# Patient Record
Sex: FEMALE | Race: WHITE
[De-identification: names, ages, dates, MRNs, and addresses within clinical notes are randomized per-mention and may not be internally consistent; named-entity substitution may affect disease eponyms.]

---

## 2018-12-14 ENCOUNTER — HOSPITAL ENCOUNTER (INPATIENT)
Dept: HOSPITAL 80 - FED | Age: 48
LOS: 2 days | Discharge: HOME | DRG: 918 | End: 2018-12-16
Attending: HOSPITALIST | Admitting: HOSPITALIST
Payer: SELF-PAY

## 2018-12-14 DIAGNOSIS — E87.6: ICD-10-CM

## 2018-12-14 DIAGNOSIS — E87.1: ICD-10-CM

## 2018-12-14 DIAGNOSIS — Z23: ICD-10-CM

## 2018-12-14 DIAGNOSIS — T65.892A: Primary | ICD-10-CM

## 2018-12-14 DIAGNOSIS — N17.9: ICD-10-CM

## 2018-12-14 DIAGNOSIS — R07.89: ICD-10-CM

## 2018-12-14 DIAGNOSIS — M54.5: ICD-10-CM

## 2018-12-14 DIAGNOSIS — F41.0: ICD-10-CM

## 2018-12-14 DIAGNOSIS — R79.89: ICD-10-CM

## 2018-12-14 DIAGNOSIS — F17.210: ICD-10-CM

## 2018-12-14 DIAGNOSIS — F60.3: ICD-10-CM

## 2018-12-14 DIAGNOSIS — E86.0: ICD-10-CM

## 2018-12-14 DIAGNOSIS — R11.2: ICD-10-CM

## 2018-12-14 LAB — PLATELET # BLD: 250 10^3/UL (ref 150–400)

## 2018-12-14 PROCEDURE — G0008 ADMIN INFLUENZA VIRUS VAC: HCPCS

## 2018-12-14 PROCEDURE — G0480 DRUG TEST DEF 1-7 CLASSES: HCPCS

## 2018-12-14 PROCEDURE — G0009 ADMIN PNEUMOCOCCAL VACCINE: HCPCS

## 2018-12-14 RX ADMIN — SODIUM CHLORIDE SCH MLS: 900 INJECTION, SOLUTION INTRAVENOUS at 16:09

## 2018-12-14 RX ADMIN — POTASSIUM CHLORIDE SCH MLS: 10 INJECTION, SOLUTION INTRAVENOUS at 12:03

## 2018-12-14 RX ADMIN — POTASSIUM CHLORIDE SCH MLS: 10 INJECTION, SOLUTION INTRAVENOUS at 13:50

## 2018-12-14 NOTE — PDGENHP
History and Physical





- Chief Complaint


N/V, ingestion of substance 6 days pta





- History of Present Illness


47 yo female with h/o anxiety and panic attacks with recent life stressors 

presents to ED with difficulty walking.  Six days pta she ingested potassium 

ferrous cyanide tri-hydrate, which she ordered from [employer].  Prior to this, she 

was distressed by a conflict with an old friend.  She also lives in a different 

state than her  and has been stressed with travel and work.  She just 

opened a hair salon.  Due to stress and conflict, she considered ingesting 

cyanide with intent to self harm.  She ordered what she thought was cyanide 

from the internet, but then regretted it and tried to cancel the order.  The 

substance arrived and she attempted to return it, but carried it around for 

days.  After a conflict with a friend, she became distraught and impulsive and 

took the substance.  She then spent several days induced vomiting and says she 

vomited consistently for 3-4 days.  No hematemesis or coffee ground emesis.  No 

fevers/chills.  No flank pain or urinary symptoms, but she did note dark, 

concentrated urine.  Today, she felt off balance and presented to the ED.  She 

is embarrassed by what she did and states she is not currently suicidal.  She 

does have h/o anxiety and panic, for which she takes occasional Valium.  


In the ED, she was found to have SHANELL with electrolyte disturbances and is 

admitted for further management.





History Information





- Allergies/Home Medication List


Allergies/Adverse Reactions: 








No Known Allergies Allergy (Verified 12/14/18 10:17)


 





Home Medications: 








Acetamn/Diphenhydramine 500/25 [Tylenol PM (*)] 1 - 3 mg PO HS PRN 12/14/18 [

Last Taken 12/13/18 3 tabs]


Acyclovir [Zovirax 400 mg (*)] 400 mg PO DAILY 12/14/18 [Last Taken Unknown]


Multivitamins [Multivitamin (*)] 1 each PO DAILY 12/14/18 [Last Taken Unknown]


Naphazoline HCl/Glycerin [Clear Eyes Redness Relief Drop] 1 drop OP DAILY PRN 12 /14/18 [Last Taken Unknown]





I have personally reviewed and updated: family history, medical history, social 

history, surgical history





- Past Medical History


Additional medical history: anxiety, panic attacks.  chronic back pain





- Surgical History


Reports: no pertinent surgical hx





- Family History


Additional family history: brother has schizophrenia





- Social History


Smoking Status: Current some day smoker


Alcohol Use: Other (intermittent high risk drinking)


Drug Use: None


Additional social history: , but  lives out of state.  Works as 

 and 





Review of Systems


Review of Systems: 





ROS: 10pt was reviewed & negative except for what was stated in HPI & below





Physical Exam


Physical Exam: 

















Temp Pulse Resp BP Pulse Ox


 


 36.9 C   100   20   144/100 H  99 


 


 12/14/18 15:15  12/14/18 15:15  12/14/18 15:15  12/14/18 15:15  12/14/18 15:15











Constitutional: no apparent distress


Eyes: PERRL


Ears, Nose, Mouth, Throat: dry mucous membranes


Cardiovascular: regular rate and rhythym


Respiratory: no respiratory distress, clear to auscultation


Gastrointestinal: normoactive bowel sounds, soft, non-tender abdomen


Skin: warm


Musculoskeletal: full muscle strength


Neurologic: AAOx3


Psychiatric: interacting appropriately





Lab Data & Imaging Review





 12/14/18 10:50





 12/14/18 13:35














WBC  10.94 10^3/uL (3.80-9.50)  H  12/14/18  10:50    


 


RBC  4.60 10^6/uL (4.18-5.33)   12/14/18  10:50    


 


Hgb  15.0 g/dL (12.6-16.3)   12/14/18  10:50    


 


Hct  42.3 % (38.0-47.0)   12/14/18  10:50    


 


MCV  92.0 fL (81.5-99.8)   12/14/18  10:50    


 


MCH  32.6 pg (27.9-34.1)   12/14/18  10:50    


 


MCHC  35.5 g/dL (32.4-36.7)   12/14/18  10:50    


 


RDW  11.9 % (11.5-15.2)   12/14/18  10:50    


 


Plt Count  250 10^3/uL (150-400)   12/14/18  10:50    


 


MPV  9.4 fL (8.7-11.7)   12/14/18  10:50    


 


Neut % (Auto)  81.2 % (39.3-74.2)  H  12/14/18  10:50    


 


Lymph % (Auto)  9.3 % (15.0-45.0)  L  12/14/18  10:50    


 


Mono % (Auto)  7.9 % (4.5-13.0)   12/14/18  10:50    


 


Eos % (Auto)  0.5 % (0.6-7.6)  L  12/14/18  10:50    


 


Baso % (Auto)  0.4 % (0.3-1.7)   12/14/18  10:50    


 


Nucleat RBC Rel Count  0.0 % (0.0-0.2)   12/14/18  10:50    


 


Absolute Neuts (auto)  8.89 10^3/uL (1.70-6.50)  H  12/14/18  10:50    


 


Absolute Lymphs (auto)  1.02 10^3/uL (1.00-3.00)   12/14/18  10:50    


 


Absolute Monos (auto)  0.86 10^3/uL (0.30-0.80)  H  12/14/18  10:50    


 


Absolute Eos (auto)  0.05 10^3/uL (0.03-0.40)   12/14/18  10:50    


 


Absolute Basos (auto)  0.04 10^3/uL (0.02-0.10)   12/14/18  10:50    


 


Absolute Nucleated RBC  0.00 10^3/uL (0-0.01)   12/14/18  10:50    


 


Immature Gran %  0.7 % (0.0-1.1)   12/14/18  10:50    


 


Immature Gran #  0.08 10^3/uL (0.00-0.10)   12/14/18  10:50    


 


D-Dimer  < 0.27 ug/mLFEU (0.00-0.50)   12/14/18  10:46    


 


Puncture Site  VENOUS   12/14/18  13:35    


 


Patient Temperature  37.0 DEGREES  12/14/18  13:35    


 


VBG pH  7.23  (7.31-7.42)  L  12/14/18  13:35    


 


VBG HCO3  15 mEQ/L (22-26)  L  12/14/18  13:35    


 


VBG Total CO2  16 mEq/L (21-27)  L  12/14/18  13:35    


 


VBG O2 Saturation  61 % (65-75)  L  12/14/18  13:35    


 


VBG Base Excess  -12.0 mEq/L (-2.5-2.5)  L  12/14/18  13:35    


 


VBG Lactic Acid  0.9 mmol/L (0.7-2.1)   12/14/18  13:35    


 


Mixed VBG pCO2  36 mmHg (40-44)  L  12/14/18  13:35    


 


Mixed VBG pO2  34 mmHG (35-40)  L  12/14/18  13:35    


 


Sodium  131 mEq/L (135-145)  L  12/14/18  13:35    


 


Potassium  3.5 mEq/L (3.5-5.2)   12/14/18  13:35    


 


Chloride  109 mEq/L ()   12/14/18  13:35    


 


Carbon Dioxide  16 mEq/l (22-31)  L  12/14/18  13:35    


 


Anion Gap  6 mEq/L (6-14)   12/14/18  13:35    


 


BUN  23 mg/dL (7-23)   12/14/18  13:35    


 


Creatinine  3.0 mg/dL (0.6-1.0)  H  12/14/18  13:35    


 


Estimated GFR  17   12/14/18  13:35    


 


Glucose  115 mg/dL ()  H  12/14/18  13:35    


 


Calcium  9.1 mg/dL (8.5-10.4)   12/14/18  13:35    


 


Total Bilirubin  0.8 mg/dL (0.1-1.4)   12/14/18  13:35    


 


Conjugated Bilirubin  0.4 mg/dL (0.0-0.5)   12/14/18  10:50    


 


Unconjugated Bilirubin  0.8 mg/dL (0.0-1.1)   12/14/18  10:50    


 


AST  54 IU/L (14-46)  H  12/14/18  13:35    


 


ALT  61 IU/L (9-52)  H  12/14/18  13:35    


 


Alkaline Phosphatase  58 IU/L ()   12/14/18  13:35    


 


POC Troponin I  0.00 ng/mL (0.00-0.08)   12/14/18  11:07    


 


Total Protein  5.9 g/dL (6.3-8.2)  L  12/14/18  13:35    


 


Albumin  3.3 g/dL (3.5-5.0)  L  12/14/18  13:35    


 


Beta HCG, Qual  NEGATIVE   12/14/18  10:50    


 


Urine Color  YELLOW   12/14/18  12:30    


 


Urine Appearance  MODERATELY TURBID   12/14/18  12:30    


 


Urine pH  6.0  (5.0-7.5)   12/14/18  12:30    


 


Ur Specific Gravity  1.011  (1.002-1.030)   12/14/18  12:30    


 


Urine Protein  2+  (NEGATIVE)  H  12/14/18  12:30    


 


Urine Ketones  1+  (NEGATIVE)  H  12/14/18  12:30    


 


Urine Blood  1+  (NEGATIVE)  H  12/14/18  12:30    


 


Urine Nitrate  NEGATIVE  (NEGATIVE)   12/14/18  12:30    


 


Urine Bilirubin  NEGATIVE  (NEGATIVE)   12/14/18  12:30    


 


Urine Urobilinogen  NEGATIVE EU (0.2-1.0)   12/14/18  12:30    


 


Ur Leukocyte Esterase  2+  (NEGATIVE)  H  12/14/18  12:30    


 


Urine RBC  15-25 /hpf (0-3)  H  12/14/18  12:30    


 


Urine WBC   /hpf (0-3)  H  12/14/18  12:30    


 


Ur Epithelial Cells  TRACE /lpf (NONE-1+)   12/14/18  12:30    


 


Urine Bacteria  3+ /hpf (NONE SEEN)  H  12/14/18  12:30    


 


Granular Casts   /lpf (0-1)  H  12/14/18  12:30    


 


Urine Mucus  TRACE /lpf (NONE-1+)   12/14/18  12:30    


 


Ur Culture Indicated?  INDICATED  (NI)  H  12/14/18  12:30    


 


Ur Random Microalbumin  Cancelled   12/14/18  12:30    


 


Urine Glucose  3+  (NEGATIVE)  H  12/14/18  12:30    


 


Salicylates  < 1.0 mg/dL (2.0-20.0)  L  12/14/18  10:50    


 


Urine Opiates Screen  NEGATIVE  (NEGATIVE)   12/14/18  12:30    


 


Acetaminophen  < 10 mcg/mL (10-30)  L  12/14/18  10:50    


 


Urine Barbiturates  NEGATIVE  (NEGATIVE)   12/14/18  12:30    


 


Ur Phencyclidine Scrn  NEGATIVE  (NEGATIVE)   12/14/18  12:30    


 


Ur Amphetamine Screen  NEGATIVE  (NEGATIVE)   12/14/18  12:30    


 


U Benzodiazepines Scrn  NEGATIVE  (NEGATIVE)   12/14/18  12:30    


 


Urine Cocaine Screen  NEGATIVE  (NEGATIVE)   12/14/18  12:30    


 


U Marijuana (THC) Screen  NEGATIVE  (NEGATIVE)   12/14/18  12:30    


 


Ethyl Alcohol  < 10 mg/dL (0-10)   12/14/18  10:50    








Visualized and Interpreted Chest x-ray results: Yes


Chest X-Ray results: no infiltrate


Visualized and Interpreted EKG results: Yes


EKG Interpretation: Positive for: other (sinus tac)





Assessment & Plan


Assessment: 





Intentional ingestion with intent to cause self harm (6 days prior to arrival) 


   -Admit to ICU for close monitoring, suicide precautions, M1 hold given 

severity of recent suicide attempt


   -TLC eval when medically cleared





Acute kidney injury (Acute) - suspect pre-renal, though consider intrinsic 

nephrotoxicity from ingestion.  u/s normal, Cr trending down after 2 L NS in ED


   -send ua, urine Na, Cr to check Fena, suspect pre-renal given her h/o N/V 

leading up to presentation





Hypokalemia (Acute) - replace cautiously with SHANELL





Hyponatremia (Acute) - suspect hypovolemic hyponatremia, resolving with NS in ED





Elevated LFTs - mild, trend for now





Metabolic acidosis - suspect starvation ketosis, resolving quickly with IVF's





Anxiety - pt notes h/o panic attacks, mood lability


   -prn ativan, low dose with low renal clearance





Full code





Dispo - inpt, anticipate >48 hrs hospitalization for acute renal failure and 

further psychiatric evaluation once medically clear

## 2018-12-14 NOTE — EDPHY
General


Time Seen by Provider: 12/14/18 10:30


Narrative: 





CHIEF COMPLAINT: 


I took cyanide on Saturday, chest pain





HISTORY OF PRESENT ILLNESS: 


Patient presents private vehicle with her friend with complaints ingestion 

cyanide on Saturday.  She says that she has been increasingly stressed and 

anxious over the past few weeks.  She reports that her  currently lives 

in [place] and she flies frequently back and forth.  She also has a great fear 

of flying.  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 

business.  She says that this all culminated with her "just wanted to all go 

away." She reports taking cyanide on Saturday but immediately feeling 

remorseful.  She immediately tried to make herself vomit, and she did so 

several times.  She says over the past few days she has had some chest pain, 

esophageal pain, nausea and some epigastric pain.  Mild-to-moderate at times.  

Moderate to severe at other times.  No trauma or injury.  No hematemesis.  No 

dark tarry stools or hematochezia.  She has no other associated complaints or 

modifying factors.





PSYCHIATRIC DIAGNOSES:


None





PRIOR PSYCHIATRIC EVALUATIONS:


Anxiety.  Fear of flying





M1/DETAINER:


10:50 a.m., Dr. McCollester and myself





REVIEW OF SYSTEMS:


Ten systems reviewed and are negative unless otherwise noted in the HPI








EXAMINATION


General Appearance:  Alert, no distress.  Anxious.  Well-appearing.


Head: normocephalic, atraumatic


Eyes:  Pupils equal and round, no conjunctival pallor or injection


ENT, Mouth:  Mucous membranes moist


Neck:  Normal inspection, supple, non-tender


Respiratory:  Lungs are clear to auscultation.  No wheezing rhonchi or crackles


Cardiovascular tachycardic rate and rhythm.  No murmur.


Gastrointestinal:  Abdomen is soft and nontender


Back: non-tender, no bony abnormalities


Neurological:  A&O, nonfocal, normal gait


Skin:  Warm and dry, no rash


Extremities:  Nontender, no pedal edema


Psychiatric:  Anxious mood and affect.  She admits to previous suicidal 

ideation with no intent to harm herself at this time.  She denies feeling 

suicidal in the past 48 hr.








DIFFERENTIAL DIAGNOSES:


Including but not limited to attempted suicide, depression, anxiety, stress, 

esophagitis, gastritis








MDM:


10:50 a.m.


Reported attempted sign-out ingestion on Saturday with immediate remorse.  She 

made herself vomit immediately several times.  She now complains of some 

esophageal pain, occasional chest pain shortness of breath.  She has also had 

some epigastric discomfort at times.  She has no intent to harm herself and 

adamantly denies feeling suicidal over the past 48 hr.  I will discuss with 

poison Control and obtain laboratory studies.





11:05 a.m.


Case discussed with Poison Control. Case number [id number]


Recommendations are symptomatic control. No specific tests indicated.





11:50 a.m.


Laboratory studies reveal an acute, critical hypokalemia with an acute kidney 

injury as well.  The ratios do suggest pre renal azotemia.  I have ordered 

additional fluid and she has already received 1 L. I have ordered p.o. And IV 

potassium.  Shared he has an EKG obtained with no conduction delay.  Chest x-

ray unremarkable.  D-dimer is negative.  I discussed case with the hospitalist, 

Luciana Alamo.  The patient be admitted to the step-down unit on a cardiac 

monitor to Dr. Kelley Cushing.  Patient is aware of this.  She is admitted 

stable condition.





1:00 p.m.


Notified by Dr. Mantilla, radiology.  Renal ultrasound as no acute findings.





1:23 p.m.


Case discussed again with poison control.  They recommended that we check a 

lactic acid and bicarb.  They also recommend a 1 time oral loading dose of N-

acetylcysteine.  They recommend trending the liver function test.  I have 

ordered these tests and here to these recommendations.  I have also discussed 

this with the admitting physician Dr. Cushing.





2:20 p.m.


Lactic acid is-0.9.  Her bicarb is trending up from 11 to 16. She is mildly 

acidotic at 7.26.  Her repeat potassium 7.5.  Serum creatinine is also trending 

down.  





SUPERVISION: 


Patient was independently examined, but I discussed the case with my secondary 

supervising physician Dr. McCollester








Consultation:


Poison Control





- Diagnostics


Imaging Results: 


 Imaging Impressions





Chest X-Ray  12/14/18 10:53


Impression:  No acute findings in the chest.


 


 


 











Imaging: I viewed and interpreted images myself





- History


History Review: I reviewed the patient's medical records


Smoking Status: Current some day smoker





- Objective


Vital Signs: 


 Initial Vital Signs











Heart Rate  138 H  12/14/18 10:14


 


Respiratory Rate  18   12/14/18 10:14


 


Blood Pressure  143/92 H  12/14/18 10:14


 


O2 Sat (%)  97   12/14/18 10:14








 











O2 Delivery Mode               Room Air














Allergies/Adverse Reactions: 


 





No Known Allergies Allergy (Verified 12/14/18 10:17)


 








Home Medications: 














 Medication  Instructions  Recorded


 


Acetamn/Diphenhydramine 500/25 1 - 3 mg PO HS PRN 12/14/18





[Tylenol PM (*)]  


 


Acyclovir [Zovirax 400 mg (*)] 400 mg PO DAILY 12/14/18


 


Multivitamins [Multivitamin (*)] 1 each PO DAILY 12/14/18


 


Naphazoline HCl/Glycerin [Clear 1 drop OP DAILY PRN 12/14/18





Eyes Redness Relief Drop]  











Laboratory Results: 


 Laboratory Results





 12/14/18 10:50 





 12/14/18 10:50 





 











  12/14/18 12/14/18 12/14/18





  11:07 10:50 10:50


 


WBC      





    


 


RBC      





    


 


Hgb      





    


 


Hct      





    


 


MCV      





    


 


MCH      





    


 


MCHC      





    


 


RDW      





    


 


Plt Count      





    


 


MPV      





    


 


Neut % (Auto)      





    


 


Lymph % (Auto)      





    


 


Mono % (Auto)      





    


 


Eos % (Auto)      





    


 


Baso % (Auto)      





    


 


Nucleat RBC Rel Count      





    


 


Absolute Neuts (auto)      





    


 


Absolute Lymphs (auto)      





    


 


Absolute Monos (auto)      





    


 


Absolute Eos (auto)      





    


 


Absolute Basos (auto)      





    


 


Absolute Nucleated RBC      





    


 


Immature Gran %      





    


 


Immature Gran #      





    


 


D-Dimer      





    


 


Sodium      127 mEq/L L mEq/L





     (135-145) 


 


Potassium      2.7 mEq/L L* mEq/L





     (3.5-5.2) 


 


Chloride      102 mEq/L mEq/L





     () 


 


Carbon Dioxide      11 mEq/l L mEq/l





     (22-31) 


 


Anion Gap      14 mEq/L mEq/L





     (6-14) 


 


BUN      25 mg/dL H mg/dL





     (7-23) 


 


Creatinine      3.2 mg/dL H mg/dL





     (0.6-1.0) 


 


Estimated GFR      16 





    


 


Glucose      180 mg/dL H mg/dL





     () 


 


Calcium      10.6 mg/dL H mg/dL





     (8.5-10.4) 


 


Total Bilirubin      1.2 mg/dL mg/dL





     (0.1-1.4) 


 


Conjugated Bilirubin      0.4 mg/dL mg/dL





     (0.0-0.5) 


 


Unconjugated Bilirubin      0.8 mg/dL mg/dL





     (0.0-1.1) 


 


AST      70 IU/L H IU/L





     (14-46) 


 


ALT      73 IU/L H IU/L





     (9-52) 


 


Alkaline Phosphatase      78 IU/L IU/L





     () 


 


POC Troponin I  0.00 ng/mL ng/mL    





   (0.00-0.08)   


 


Total Protein      7.8 g/dL g/dL





     (6.3-8.2) 


 


Albumin      4.6 g/dL g/dL





     (3.5-5.0) 


 


Beta HCG, Qual    NEGATIVE   





    


 


Salicylates      < 1.0 mg/dL L mg/dL





     (2.0-20.0) 


 


Acetaminophen      < 10 mcg/mL L mcg/mL





     (10-30) 


 


Ethyl Alcohol      < 10 mg/dL mg/dL





     (0-10) 














  12/14/18 12/14/18





  10:50 10:46


 


WBC  10.94 10^3/uL H 10^3/uL  





   (3.80-9.50)  


 


RBC  4.60 10^6/uL 10^6/uL  





   (4.18-5.33)  


 


Hgb  15.0 g/dL g/dL  





   (12.6-16.3)  


 


Hct  42.3 % %  





   (38.0-47.0)  


 


MCV  92.0 fL fL  





   (81.5-99.8)  


 


MCH  32.6 pg pg  





   (27.9-34.1)  


 


MCHC  35.5 g/dL g/dL  





   (32.4-36.7)  


 


RDW  11.9 % %  





   (11.5-15.2)  


 


Plt Count  250 10^3/uL 10^3/uL  





   (150-400)  


 


MPV  9.4 fL fL  





   (8.7-11.7)  


 


Neut % (Auto)  81.2 % H %  





   (39.3-74.2)  


 


Lymph % (Auto)  9.3 % L %  





   (15.0-45.0)  


 


Mono % (Auto)  7.9 % %  





   (4.5-13.0)  


 


Eos % (Auto)  0.5 % L %  





   (0.6-7.6)  


 


Baso % (Auto)  0.4 % %  





   (0.3-1.7)  


 


Nucleat RBC Rel Count  0.0 % %  





   (0.0-0.2)  


 


Absolute Neuts (auto)  8.89 10^3/uL H 10^3/uL  





   (1.70-6.50)  


 


Absolute Lymphs (auto)  1.02 10^3/uL 10^3/uL  





   (1.00-3.00)  


 


Absolute Monos (auto)  0.86 10^3/uL H 10^3/uL  





   (0.30-0.80)  


 


Absolute Eos (auto)  0.05 10^3/uL 10^3/uL  





   (0.03-0.40)  


 


Absolute Basos (auto)  0.04 10^3/uL 10^3/uL  





   (0.02-0.10)  


 


Absolute Nucleated RBC  0.00 10^3/uL 10^3/uL  





   (0-0.01)  


 


Immature Gran %  0.7 % %  





   (0.0-1.1)  


 


Immature Gran #  0.08 10^3/uL 10^3/uL  





   (0.00-0.10)  


 


D-Dimer    < 0.27 ug/mLFEU ug/mLFEU





    (0.00-0.50) 


 


Sodium    





   


 


Potassium    





   


 


Chloride    





   


 


Carbon Dioxide    





   


 


Anion Gap    





   


 


BUN    





   


 


Creatinine    





   


 


Estimated GFR    





   


 


Glucose    





   


 


Calcium    





   


 


Total Bilirubin    





   


 


Conjugated Bilirubin    





   


 


Unconjugated Bilirubin    





   


 


AST    





   


 


ALT    





   


 


Alkaline Phosphatase    





   


 


POC Troponin I    





   


 


Total Protein    





   


 


Albumin    





   


 


Beta HCG, Qual    





   


 


Salicylates    





   


 


Acetaminophen    





   


 


Ethyl Alcohol    





   











Medications Given: 


 








Discontinued Medications





Al Hydroxide/Mg Hydroxide (Maalox Susp)  30 ml PO ONCE ONE


   Stop: 12/14/18 11:24


   Last Admin: 12/14/18 11:36 Dose:  30 ml


Hyoscyamine Sulfate (Levsin, Hyomax-Sl)  0.25 mg PO ONCE ONE


   Stop: 12/14/18 11:24


   Last Admin: 12/14/18 11:36 Dose:  0.25 mg


Sodium Chloride (Ns)  1,000 mls @ 0 mls/hr IV EDNOW ONE; Wide Open


   PRN Reason: Protocol


   Stop: 12/14/18 10:54


   Last Admin: 12/14/18 11:09 Dose:  1,000 mls


Potassium Chloride (Potassium Cl 20 Meq (Premix))  100 mls @ 50 mls/hr IV EDNOW 

ONE


   Stop: 12/14/18 13:42


   Last Admin: 12/14/18 12:07 Dose:  Not Given


Sodium Chloride (Ns)  1,000 mls @ 0 mls/hr IV EDNOW ONE; Wide Open


   PRN Reason: Protocol


   Stop: 12/14/18 11:47


   Last Admin: 12/14/18 12:04 Dose:  1,000 mls


Potassium Chloride (Potassium Cl 10 Meq (Premix))  100 mls @ 100 mls/hr IV Q1H 

SOWMYA


   Stop: 12/14/18 13:59


   Last Admin: 12/14/18 13:50 Dose:  100 mls


Lidocaine (Lidocaine 2% Viscous)  15 ml PO ONCE ONE


   Stop: 12/14/18 11:24


   Last Admin: 12/14/18 11:36 Dose:  15 ml


Lorazepam (Ativan Injection)  1 mg IVP EDNOW ONE


   Stop: 12/14/18 10:53


   Last Admin: 12/14/18 11:09 Dose:  1 mg


Pantoprazole Sodium (Protonix)  40 mg IVP EDNOW ONE


   Stop: 12/14/18 11:24


   Last Admin: 12/14/18 11:36 Dose:  40 mg


Potassium Chloride (Klor Packets)  40 meq PO EDNOW ONE


   Stop: 12/14/18 11:44


   Last Admin: 12/14/18 12:00 Dose:  40 meq


Promethazine HCl (Phenergan)  12.5 mg IVP ONCE ONE


   Stop: 12/14/18 10:54


   Last Admin: 12/14/18 11:08 Dose:  12.5 mg





Point of Care Test Results: 


 Chemistry











  12/14/18





  11:07


 


POC Troponin I  0.00 ng/mL ng/mL





   (0.00-0.08) 














Departure





- Departure


Disposition: [facility]


Clinical Impression: 


 Acute kidney injury, Hyperkalemia, Elevated liver function tests





Condition: Good

## 2018-12-15 RX ADMIN — PANTOPRAZOLE SODIUM SCH MG: 40 TABLET, DELAYED RELEASE ORAL at 07:59

## 2018-12-15 RX ADMIN — SODIUM CHLORIDE SCH MLS: 900 INJECTION, SOLUTION INTRAVENOUS at 04:53

## 2018-12-15 NOTE — ASMTCMCOM
CM Note

 

CM Note                       

Notes:

Discussed pt in rounds and with [facility] evaluator. Pt cleared from M1Hold and will likely discharge 

tomorrow. [facility] reports pt has robust support system of family and friends and has been connected 

with outpatient resources for behavioral health. Pt to discharge independently. CM to follow. 



D/C Plan: Independent with OP [facility] support

 

Date Signed:  12/15/2018 05:29 PM

Electronically Signed By:Karoline Bourgeois

## 2018-12-15 NOTE — PDMN
Medical Necessity


Medical necessity: MCG: M595 substance related disorders  :  pt ingested 

cyanide type substance with intent to harm self.   also with SHANELL, hypokalemia, 

hyponatremia, elevated LFT , metabolic acidosis, anxiety  anticipate > 2 MN 

ongoing med nec care, further monitoring and psych eval.
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
## 2018-12-15 NOTE — HOSPPROG
Hospitalist Progress Note


Assessment/Plan: 





Intentional ingestion with intent to cause self harm (6 days prior to arrival) 


   -M1 hold, TLC eval today as she is medically stable





Acute kidney injury (Acute) - FeNa 1.18%, likey pre-renal, could be some ATN 

component.  u/s normal, Cr trending down with IVF's


   -cont IVF's


   -follow





Hypokalemia (Acute) - cont to replete, follow





Hyponatremia (Acute) - likely hypovolemic, nearly resolved with IVF's





Elevated LFTs - mild, nearly normalized with hydration





Metabolic acidosis - suspect starvation ketosis, improving with IVF's


   -cont to follow





Anxiety - pt notes h/o panic attacks, mood lability


   -prn ativan, though pt c/o side effects so will d/c





Full code





Dispo - cont inpt, TLC eval today, anticipate d/c 1-2 days








Subjective: Pt feels better.  Complained of confusion after IV ativan.  More 

emotionally stable today.  No N/V or fevers.  Tolerating po.  Good uop.


Objective: 


 Vital Signs











Temp Pulse Resp BP Pulse Ox


 


 36.9 C   88   17   142/93 H  99 


 


 12/15/18 07:48  12/15/18 07:48  12/15/18 07:48  12/15/18 07:48  12/15/18 07:48








 Laboratory Results





 12/15/18 04:00 





 











 12/14/18 12/15/18 12/16/18





 05:59 05:59 05:59


 


Intake Total  2937 


 


Output Total  500 


 


Balance  2437 














- Physical Exam


Constitutional: no apparent distress


Eyes: PERRL


Ears, Nose, Mouth, Throat: moist mucous membranes


Cardiovascular: regular rate and rhythym


Respiratory: no respiratory distress, clear to auscultation


Gastrointestinal: normoactive bowel sounds, soft, non-tender abdomen


Skin: warm


Musculoskeletal: full muscle strength


Neurologic: AAOx3


Psychiatric: interacting appropriately





ICD10 Worksheet


Patient Problems: 


 Problems











Problem Status Onset


 


Acute kidney injury Acute  


 


Elevated liver function tests Acute  


 


Hyperkalemia Acute

## 2018-12-15 NOTE — ASMTTCLDSP
TLC Discharge Disposition

 

Disposition Notes:            

Notes:

In consultation with [facility] Hospitalist physician, Kelly Cushing, MD, and on-call psychiatrist, Augustine Woodson MD, both concurred that pt does not appear to meet 27-65 criteria requiring psychiatric 

hospitalization as pt does not appear to be an imminent risk of harm to self due to a mental 

illness condition. Dr. Carpenter vacated the M1-hold at 16:39.

Discharge                     

Concerns/Recommendations:     

Notes:

PT has connected with her large support system and enlisted help. Pt will follow up outpt therapist 


and schedule an appt with a therapist. PT also agreed to pursue treatment for alcohol abuse with 

[email or web address] (alternative to AA).

Was patient given the         Answers:  Not applicable                        

Inpatient Behavioral                                                          

Health Prohibited                                                             

Belongings List while in                                                      

the ED?                                                                       

Hold initiated by:            Answers:  Other                         Notes:  Hosptialist Physician

 

Date Signed:  12/15/2018 05:33 PM

Electronically Signed By:Marcelo Day

## 2018-12-16 VITALS — DIASTOLIC BLOOD PRESSURE: 97 MMHG | SYSTOLIC BLOOD PRESSURE: 147 MMHG

## 2018-12-16 RX ADMIN — PANTOPRAZOLE SODIUM SCH MG: 40 TABLET, DELAYED RELEASE ORAL at 08:17

## 2018-12-16 NOTE — CPEKG
Test Reason : OPEN

Blood Pressure : ***/*** mmHG

Vent. Rate : 102 BPM     Atrial Rate : 103 BPM

   P-R Int : 177 ms          QRS Dur : 109 ms

    QT Int : 341 ms       P-R-T Axes : [phone] degrees

   QTc Int : 445 ms

 

Sinus tachycardia

 

Confirmed by Arie Ventura ([id number]) on 12/16/2018 9:17:10 AM

 

Referred By:             Confirmed By:Arie Ventura

## 2018-12-16 NOTE — GDS
DISCHARGE DIAGNOSES:  

1.  Suicide attempt 1 week prior to arrival with ingestion of potassium ferricyanide trihydrate.

2.  Acute kidney injury secondary to self-induced nausea and vomiting for several days.

3.  Hypokalemia, improved.

4.  Hyponatremia, improved.

5.  Elevated liver function tests, improved.

6.  Metabolic acidosis, improved.

7.  Anxiety with panic attacks.

8.  Mood instability.



CONSULTANTS:  None.



HISTORY:  For details, please see history and physical dated December 14, 2018.  In brief, the patien
ruiz is a 48-year-old female with a history of anxiety, panic attacks, and mood lability, as well as chr
onic low back pain, who presented to the emergency department 1 week after ingestion of a substance s
he bought off the Internet which she thought was cyanide.  Following the ingestion, she felt regretfu
l and self-induced vomiting for several days.  She presented with nausea, vomiting, and difficulty wa
lking.  She was found to have acute kidney injury with electrolyte disturbances and was admitted to [facility] for further management.



HOSPITAL COURSE:  The patient admitted to the intensive care unit initially on an M1 hold.  This was 
lifted in the ER but reinstated by myself after she was found to have marked impulsivity and severe s
elf-harm behaviors.  It turns out the substance she ingested is not actually cyanide but some type of
 salt replacement.  I suspect her acute kidney injury was more related to her self-induced nausea and
 vomiting which went on for several days.  Her creatinine trended down from 3.2 to 2.2 with ongoing I
V fluid resuscitation.  She had her electrolytes replaced.  Her potassium is 3.4 on the day of discha
rge.  She did receive 40 mEq of p.o. potassium and is encouraged to eat high potassium foods.  In add
ition, she is encouraged to maintain hydration as it seems her creatinine is improving with ongoing h
ydration efforts.  I suspect her metabolic acidosis is secondary to starvation ketosis in the setting
 of ongoing nausea and vomiting.  Her serum bicarb is improving, though she does need a repeat basic 
metabolic panel in 2-3 days and she is advised to follow up with her primary care physician for this.
  She agrees to this plan.  Behavioral Health consult was obtained.  Her M1 hold was lifted as she wa
s not deemed to be an imminent danger to herself by the [facility] consultants.  An outpatient followup plan
 is in place for further mental health services.



DISPOSITION:  Patient is discharged home in stable condition.



FOLLOWUP:  She is to follow up with her primary care physician for repeat basic metabolic panel in 1-
2 days to ensure her creatinine continues to improve in the setting of ongoing oral hydration and als
o to recheck her electrolytes.



DISCHARGE MEDICATIONS:  Please see [facility] for completed outpatient medication list.  She was given 
one 10 mg tablet of Flexeril at discharge at her request for low back pain.  There are no other new m
edications at discharge.





Job #:  [id number]